# Patient Record
Sex: FEMALE | Race: WHITE | Employment: PART TIME | URBAN - METROPOLITAN AREA
[De-identification: names, ages, dates, MRNs, and addresses within clinical notes are randomized per-mention and may not be internally consistent; named-entity substitution may affect disease eponyms.]

---

## 2022-05-27 DIAGNOSIS — L71.9 ROSACEA: Primary | ICD-10-CM

## 2022-06-21 ASSESSMENT — SOCIAL DETERMINANTS OF HEALTH (SDOH)

## 2022-06-22 ENCOUNTER — OFFICE VISIT (OUTPATIENT)
Dept: OBGYN CLINIC | Age: 27
End: 2022-06-22
Payer: COMMERCIAL

## 2022-06-22 VITALS
BODY MASS INDEX: 17.68 KG/M2 | SYSTOLIC BLOOD PRESSURE: 110 MMHG | HEIGHT: 66 IN | WEIGHT: 110 LBS | DIASTOLIC BLOOD PRESSURE: 64 MMHG

## 2022-06-22 DIAGNOSIS — Z71.85 HPV VACCINE COUNSELING: ICD-10-CM

## 2022-06-22 DIAGNOSIS — Z01.419 WELL WOMAN EXAM WITH ROUTINE GYNECOLOGICAL EXAM: Primary | ICD-10-CM

## 2022-06-22 DIAGNOSIS — Z87.42 HISTORY OF DYSMENORRHEA: ICD-10-CM

## 2022-06-22 DIAGNOSIS — Z12.4 SCREENING FOR CERVICAL CANCER: ICD-10-CM

## 2022-06-22 PROCEDURE — 99385 PREV VISIT NEW AGE 18-39: CPT | Performed by: OBSTETRICS & GYNECOLOGY

## 2022-06-22 RX ORDER — DEXTROAMPHETAMINE SACCHARATE, AMPHETAMINE ASPARTATE, DEXTROAMPHETAMINE SULFATE AND AMPHETAMINE SULFATE 2.5; 2.5; 2.5; 2.5 MG/1; MG/1; MG/1; MG/1
TABLET ORAL
COMMUNITY
Start: 2022-05-11

## 2022-06-22 RX ORDER — NORETHINDRONE ACETATE AND ETHINYL ESTRADIOL 1MG-20(21)
1 KIT ORAL DAILY
Qty: 3 PACKET | Refills: 4 | Status: SHIPPED | OUTPATIENT
Start: 2022-06-22

## 2022-06-22 NOTE — PROGRESS NOTES
CC:  Annual GYN exam    HPI:  23 y.o.  Everardo Tracey presents today for a New Pt routine gynecological examination. Patient's last menstrual period was 06/15/2022 (exact date). Shannan Tadeo from [place]. No GYN care in 6yrs. PCP: [facility]     Contraception:  Dariel Spotted (states gets through an kathleen)       Menses:  Q 28  Days x 4 days, mod  Flow, no intermenstrual VB/spotting, mild dysmenorrhea, no post coital VB, no dyspareunia. No contraindications to estrogen exist        Hx of Severe Dysmenorrhea prior to starting OCPs  Has had to miss school/work  No fam hx of endometriosis that she is aware of  No g/i gu sx s   *Very possible patient has endometriosis due to patient's reported severity         Sexually active w/ same partner   No changes in sexual partners --declines STD testing          GYN HISTORY:  As per HPI     Last Pap:   About 6 yrs ago --wnl per patient  Hx of Abnl Paps: no    Hx STDs: none  Gardasil vaccine:   No           OB History        0    Para   0    Term   0       0    AB   0    Living   0       SAB   0    IAB   0    Ectopic   0    Molar   0    Multiple   0    Live Births   0                  Past Medical History:   Diagnosis Date    Severe dysmenorrhea          Past Surgical History:   Procedure Laterality Date    ORTHOPEDIC SURGERY      Fx Elbow         Outpatient Encounter Medications as of 2022   Medication Sig Dispense Refill    amphetamine-dextroamphetamine (ADDERALL, 10MG,) 10 MG tablet       norethindrone-ethinyl estradiol (JUNEL FE 1/20) 1-20 MG-MCG per tablet Take 1 tablet by mouth daily 3 packet 4    [DISCONTINUED] norethindrone-ethinyl estradiol (JUNEL FE 1/20) 1-20 MG-MCG per tablet Take 1 tablet by mouth daily       No facility-administered encounter medications on file as of 2022.          No Known Allergies      Family History   Problem Relation Age of Onset    Heart Attack Father     Cancer Paternal Grandfather         Leukemia Social History     Socioeconomic History    Marital status: Single     Spouse name: None    Number of children: None    Years of education: None    Highest education level: None   Occupational History    None   Tobacco Use    Smoking status: Never Smoker    Smokeless tobacco: Never Used   Vaping Use    Vaping Use: Never used   Substance and Sexual Activity    Alcohol use: Yes    Drug use: Never    Sexual activity: Yes     Partners: Male     Comment: OCP's   Other Topics Concern    None   Social History Narrative    None     Social Determinants of Health     Financial Resource Strain:     Difficulty of Paying Living Expenses: Not on file   Food Insecurity:     Worried About Running Out of Food in the Last Year: Not on file    Haritha of Food in the Last Year: Not on file   Transportation Needs:     Lack of Transportation (Medical): Not on file    Lack of Transportation (Non-Medical): Not on file   Physical Activity:     Days of Exercise per Week: Not on file    Minutes of Exercise per Session: Not on file   Stress:     Feeling of Stress : Not on file   Social Connections:     Frequency of Communication with Friends and Family: Not on file    Frequency of Social Gatherings with Friends and Family: Not on file    Attends Restorationism Services: Not on file    Active Member of [address] [place] [address] [employer] or Organizations: Not on file    Attends Club or Organization Meetings: Not on file    Marital Status: Not on file   Intimate Partner Violence:     Fear of Current or Ex-Partner: Not on file    Emotionally Abused: Not on file    Physically Abused: Not on file    Sexually Abused: Not on file   Housing Stability:     Unable to Pay for Housing in the Last Year: Not on file    Number of [place] in the Last Year: Not on file    Unstable Housing in the Last Year: Not on file           ROS:  Constitutional: Negative for chills, fever and weight loss. HENT: Negative for hearing loss.    Eyes: Negative for blurred vision and double vision. Respiratory: Negative for cough, hemoptysis and shortness of breath. Cardiovascular: Negative for chest pain, palpitations and orthopnea. Gastrointestinal: Negative for abdominal pain, blood in stool, constipation, diarrhea, nausea and vomiting. Genitourinary: Negative for dysuria, frequency, hematuria and urgency. Musculoskeletal: Negative for falls, joint pain and myalgias. Skin: Negative for itching and rash. Neurological: Negative for headaches. Endo/Heme/Allergies: Does not bruise/bleed easily. Psychiatric/Behavioral: Negative for depression and suicidal ideas. The patient is not nervous/anxious. All other systems reviewed and are negative. PHYSICAL EXAM:  Blood pressure 110/64, height 5' 6\" (1.676 m), weight 110 lb (49.9 kg), last menstrual period 06/15/2022. Constitutional: She appears well-developed and well-nourished. No distress. HENT:    Head: Normocephalic and atraumatic. Neck: Normal range of motion. Cardiovascular: Normal rate, regular rhythm and normal heart sounds. Exam reveals no gallop and no friction rub. No murmur heard. Pulmonary/Chest: Effort normal and breath sounds normal. No respiratory distress. She has no wheezes. She has no rales. Abdominal: Soft. Bowel sounds are normal. She exhibits no distension and no mass. There is no tenderness. There is no rebound and no guarding. Skin: She is not diaphoretic. Psychiatric: She has a normal mood and affect.  Her behavior is normal. Thought content normal. .    Pelvic:   External genitalia wnl, no lesions, rashes  Clitoris and urethra midline  Vagina pink, moist, well rugated  Cervix without lesion/masses, DC wnl, no CMT   Uterus normal in size and contour, no masses, NTTP  Adnexa without masses, NTTP    Breasts:   Symmetric, no lesions, masses, rashes, no abnl nipple Dc       Counseling:  Discussed General Recommendations:  -Routine Pap (unless cervix removed for benign reasons)  -STD screening annually pts </= 26yo or high risk   -lipid profile every 5 yrs  -Tdap once and then Td every 10yrs  -Influenza Vaccine, annually  -Healthy eating/exercise   -HPV vaccine newest recs (8yo-46yo)    Discussed patient's history of severe dysmenorrhea. Very possible she has endometriosis that is currently well controlled. Would not recommend coming off of anything for hormonal regulation/ovulation suppression unless/until she is ready to try to conceive.       ASSESSMENT/PLAN:   23 y.o., , for new patient annual GYN exam:    1) Annual:   -Pap today    -declines STD testing    -Rx OCP refill     -safe sexual practices    -FU w/ PCP for all non-GYN medical issues and regular screening     -annual Flu vaccine recommended    -healthy eating, exercise    -Start Gardasil series      2) severe dysmenorrhea:   -See counseling--very possible endometriosis   -Well-controlled with OCPs   -NSAIDs as needed       Clint Dhaliwal MD

## 2022-06-26 LAB
CYTOLOGIST CVX/VAG CYTO: NORMAL
CYTOLOGY CVX/VAG DOC THIN PREP: NORMAL
HPV REFLEX: NORMAL
Lab: NORMAL
PATH REPORT.FINAL DX SPEC: NORMAL
STAT OF ADQ CVX/VAG CYTO-IMP: NORMAL

## 2023-02-27 RX ORDER — NORETHINDRONE ACETATE AND ETHINYL ESTRADIOL 1MG-20(21)
1 KIT ORAL DAILY
Qty: 3 PACKET | Refills: 4 | OUTPATIENT
Start: 2023-02-27

## 2023-05-31 RX ORDER — NORETHINDRONE ACETATE AND ETHINYL ESTRADIOL 1MG-20(21)
1 KIT ORAL DAILY
Qty: 3 PACKET | Refills: 4 | OUTPATIENT
Start: 2023-05-31

## 2023-12-20 ENCOUNTER — OFFICE VISIT (OUTPATIENT)
Dept: OBGYN CLINIC | Age: 28
End: 2023-12-20
Payer: COMMERCIAL

## 2023-12-20 VITALS
DIASTOLIC BLOOD PRESSURE: 62 MMHG | HEIGHT: 66 IN | WEIGHT: 95 LBS | SYSTOLIC BLOOD PRESSURE: 102 MMHG | BODY MASS INDEX: 15.27 KG/M2

## 2023-12-20 DIAGNOSIS — Z01.419 WELL WOMAN EXAM WITH ROUTINE GYNECOLOGICAL EXAM: Primary | ICD-10-CM

## 2023-12-20 DIAGNOSIS — Z71.85 HPV VACCINE COUNSELING: ICD-10-CM

## 2023-12-20 DIAGNOSIS — N63.11 MASS OF UPPER OUTER QUADRANT OF RIGHT BREAST: ICD-10-CM

## 2023-12-20 PROCEDURE — 99395 PREV VISIT EST AGE 18-39: CPT | Performed by: OBSTETRICS & GYNECOLOGY

## 2023-12-20 RX ORDER — NORETHINDRONE ACETATE AND ETHINYL ESTRADIOL 1MG-20(21)
1 KIT ORAL DAILY
Qty: 3 PACKET | Refills: 4 | Status: CANCELLED | OUTPATIENT
Start: 2023-12-20

## 2023-12-20 NOTE — PROGRESS NOTES
CC:  Annual GYN exam    HPI:  33 y.o.  Devante Smith presents today for a routine gynecological examination. Patient's last menstrual period was 2023. Jimmie Closs Last seen by me for AE 22. PCP:  none currently     Pt is a Medical Assistant        Contraception:  Junel OCPs (states gets through an kathleen)             Menses:  Q 28  Days x 4 days, mod  Flow, no intermenstrual VB/spotting, mild dysmenorrhea, no post coital VB, no dyspareunia.        No contraindications to estrogen exist              Hx of Severe Dysmenorrhea prior to starting OCPs  Has had to miss school/work  No fam hx of endometriosis that she is aware of  No g/i gu sx s   *Very possible patient has endometriosis due to patient's reported severity  Well controlled currently on OCPs          Sexually active w/ new  partner -- declines STD testing            ADHD:  On Adderall provided by [facility] ([facility])  Sees NP trinity murphy   BMI 15 --monitor          Breast Mass:  Pt reports noticing a small lump on her Right breast about 1 month ago  No nipple DC, skin changes, pain, etc  No assoc sxs          GYN HISTORY:  As per HPI     Last Pap:  23-- neg cytology   Hx of Abnl Paps: no     Hx STDs: none  Gardasil vaccine:   No         OB History          0    Para   0    Term   0       0    AB   0    Living   0         SAB   0    IAB   0    Ectopic   0    Molar   0    Multiple   0    Live Births   0                  Past Medical History:   Diagnosis Date    BMI less than 19,adult     Rosacea     Severe dysmenorrhea          Past Surgical History:   Procedure Laterality Date    ORTHOPEDIC SURGERY      Fx Elbow         Outpatient Encounter Medications as of 2023   Medication Sig Dispense Refill    norethindrone-ethinyl estradiol (JUNEL FE 1/20) 1-20 MG-MCG per tablet Take 1 tablet by mouth daily 3 packet 4    amphetamine-dextroamphetamine (ADDERALL, 10MG,) 10 MG tablet       [DISCONTINUED] norethindrone-ethinyl

## 2023-12-27 PROBLEM — N63.10 BREAST MASS, RIGHT: Status: ACTIVE | Noted: 2023-12-27

## 2023-12-27 RX ORDER — NORETHINDRONE ACETATE AND ETHINYL ESTRADIOL 1MG-20(21)
1 KIT ORAL DAILY
Qty: 3 PACKET | Refills: 4 | Status: SHIPPED | OUTPATIENT
Start: 2023-12-27

## 2024-02-08 ENCOUNTER — HOSPITAL ENCOUNTER (OUTPATIENT)
Dept: MAMMOGRAPHY | Age: 29
Discharge: HOME | End: 2024-02-08
Attending: OBSTETRICS & GYNECOLOGY
Payer: COMMERCIAL

## 2024-02-08 DIAGNOSIS — N63.11 MASS OF UPPER OUTER QUADRANT OF RIGHT BREAST: ICD-10-CM

## 2024-02-08 PROCEDURE — 76642 ULTRASOUND BREAST LIMITED: CPT

## 2024-08-30 RX ORDER — NORETHINDRONE ACETATE AND ETHINYL ESTRADIOL 1MG-20(21)
1 KIT ORAL DAILY
Qty: 3 PACKET | Refills: 4 | Status: CANCELLED | OUTPATIENT
Start: 2024-08-30

## 2024-09-03 RX ORDER — NORETHINDRONE ACETATE AND ETHINYL ESTRADIOL 1MG-20(21)
1 KIT ORAL DAILY
Qty: 3 PACKET | Refills: 1 | Status: SHIPPED | OUTPATIENT
Start: 2024-09-03